# Patient Record
Sex: MALE | Race: BLACK OR AFRICAN AMERICAN | Employment: OTHER | URBAN - NONMETROPOLITAN AREA
[De-identification: names, ages, dates, MRNs, and addresses within clinical notes are randomized per-mention and may not be internally consistent; named-entity substitution may affect disease eponyms.]

---

## 2021-05-25 LAB — COLONOSCOPY, EXTERNAL: NORMAL

## 2022-05-22 ENCOUNTER — HOSPITAL ENCOUNTER (EMERGENCY)
Age: 62
Discharge: HOME | End: 2022-05-22
Attending: EMERGENCY MEDICINE
Payer: COMMERCIAL

## 2022-05-22 ENCOUNTER — APPOINTMENT (OUTPATIENT)
Dept: GENERAL RADIOLOGY | Age: 62
End: 2022-05-22
Attending: EMERGENCY MEDICINE
Payer: COMMERCIAL

## 2022-05-22 VITALS
TEMPERATURE: 98.3 F | RESPIRATION RATE: 20 BRPM | BODY MASS INDEX: 30.48 KG/M2 | DIASTOLIC BLOOD PRESSURE: 76 MMHG | OXYGEN SATURATION: 97 % | HEART RATE: 84 BPM | SYSTOLIC BLOOD PRESSURE: 112 MMHG | HEIGHT: 72 IN | WEIGHT: 225 LBS

## 2022-05-22 DIAGNOSIS — J20.9 ACUTE BRONCHITIS, UNSPECIFIED ORGANISM: Primary | ICD-10-CM

## 2022-05-22 PROCEDURE — 71046 X-RAY EXAM CHEST 2 VIEWS: CPT

## 2022-05-22 PROCEDURE — 74011250637 HC RX REV CODE- 250/637: Performed by: EMERGENCY MEDICINE

## 2022-05-22 PROCEDURE — 99283 EMERGENCY DEPT VISIT LOW MDM: CPT

## 2022-05-22 RX ORDER — PHENYTOIN SODIUM 100 MG/1
300 CAPSULE, EXTENDED RELEASE ORAL
COMMUNITY

## 2022-05-22 RX ORDER — AZITHROMYCIN 250 MG/1
250 TABLET, FILM COATED ORAL DAILY
Qty: 4 TABLET | Refills: 0 | Status: SHIPPED | OUTPATIENT
Start: 2022-05-23 | End: 2022-05-27

## 2022-05-22 RX ORDER — BENZONATATE 100 MG/1
100 CAPSULE ORAL
Qty: 20 CAPSULE | Refills: 0 | Status: SHIPPED | OUTPATIENT
Start: 2022-05-22 | End: 2022-05-29

## 2022-05-22 RX ORDER — RANITIDINE 150 MG/1
150 TABLET, FILM COATED ORAL
COMMUNITY
End: 2022-09-27 | Stop reason: SDUPTHER

## 2022-05-22 RX ORDER — AZITHROMYCIN 250 MG/1
500 TABLET, FILM COATED ORAL
Status: COMPLETED | OUTPATIENT
Start: 2022-05-22 | End: 2022-05-22

## 2022-05-22 RX ORDER — DICLOFENAC SODIUM 50 MG/1
50 TABLET, DELAYED RELEASE ORAL
COMMUNITY
End: 2022-09-27 | Stop reason: SDUPTHER

## 2022-05-22 RX ORDER — BENZONATATE 100 MG/1
100 CAPSULE ORAL
Status: COMPLETED | OUTPATIENT
Start: 2022-05-22 | End: 2022-05-22

## 2022-05-22 RX ORDER — PHENOBARBITAL 64.8 MG/1
TABLET ORAL
COMMUNITY
Start: 2022-04-29 | End: 2022-09-27 | Stop reason: SDUPTHER

## 2022-05-22 RX ADMIN — AZITHROMYCIN 500 MG: 250 TABLET, FILM COATED ORAL at 21:52

## 2022-05-22 RX ADMIN — BENZONATATE 100 MG: 100 CAPSULE ORAL at 21:22

## 2022-05-23 NOTE — ED TRIAGE NOTES
Reports cough for the last couple of days. Cousin insisted on him coming to be checked thought he might have pneumonia. Cough productive of yellow mucus. No fever.

## 2022-05-23 NOTE — ED PROVIDER NOTES
Pt c/o cough, prod of mild yellow sputum. No fever. No sob. No chest pain. No abd pain. No leg pain or swelling. no weakness. Tried cough drop at home but didn't help. Sx's mild to moderate. No sore throat. Past Medical History:   Diagnosis Date    GERD (gastroesophageal reflux disease)     Seizure ([facility] [address]        History reviewed. No pertinent surgical history. History reviewed. No pertinent family history. Social History     Socioeconomic History    Marital status: SINGLE     Spouse name: Not on file    Number of children: Not on file    Years of education: Not on file    Highest education level: Not on file   Occupational History    Not on file   Tobacco Use    Smoking status: Never Smoker    Smokeless tobacco: Never Used   Substance and Sexual Activity    Alcohol use: Not Currently    Drug use: Never    Sexual activity: Not on file   Other Topics Concern    Not on file   Social History Narrative    Not on file     Social Determinants of Health     Financial Resource Strain:     Difficulty of Paying Living Expenses: Not on file   Food Insecurity:     Worried About Running Out of Food in the Last Year: Not on file    Jaz of Food in the Last Year: Not on file   Transportation Needs:     Lack of Transportation (Medical): Not on file    Lack of Transportation (Non-Medical):  Not on file   Physical Activity:     Days of Exercise per Week: Not on file    Minutes of Exercise per Session: Not on file   Stress:     Feeling of Stress : Not on file   Social Connections:     Frequency of Communication with Friends and Family: Not on file    Frequency of Social Gatherings with Friends and Family: Not on file    Attends Nondenominational Services: Not on file    Active Member of Clubs or Organizations: Not on file    Attends Club or Organization Meetings: Not on file    Marital Status: Not on file   Intimate Partner Violence:     Fear of Current or Ex-Partner: Not on file   Roro Morales Emotionally Abused: Not on file    Physically Abused: Not on file    Sexually Abused: Not on file   Housing Stability:     Unable to Pay for Housing in the Last Year: Not on file    Number of Places Lived in the Last Year: Not on file    Unstable Housing in the Last Year: Not on file         ALLERGIES: Patient has no known allergies. Review of Systems   Constitutional: Negative for diaphoresis and fever. HENT: Negative for congestion. Respiratory: Positive for cough. Negative for shortness of breath. Cardiovascular: Negative for chest pain. Gastrointestinal: Negative for abdominal pain and nausea. Musculoskeletal: Negative for back pain. Skin: Negative for rash. Neurological: Negative for dizziness. All other systems reviewed and are negative. Vitals:    05/22/22 2110   BP: 112/76   Pulse: 84   Resp: 20   Temp: 98.3 °F (36.8 °C)   SpO2: 97%   Weight: 102.1 kg (225 lb)   Height: 6' (1.829 m)            Physical Exam  Vitals and nursing note reviewed. Constitutional:       Appearance: He is well-developed. HENT:      Head: Normocephalic and atraumatic. Nose: Nose normal.      Mouth/Throat:      Mouth: Mucous membranes are moist.   Eyes:      Conjunctiva/sclera: Conjunctivae normal.   Cardiovascular:      Rate and Rhythm: Normal rate and regular rhythm. Pulmonary:      Effort: Pulmonary effort is normal. No respiratory distress. Breath sounds: No wheezing or rales. Abdominal:      Palpations: Abdomen is soft. Tenderness: There is no abdominal tenderness. Musculoskeletal:         General: No tenderness. Cervical back: Normal range of motion. Skin:     General: Skin is warm and dry. Capillary Refill: Capillary refill takes less than 2 seconds. Findings: No rash. Neurological:      Mental Status: He is alert and oriented to person, place, and time.    Psychiatric:         Mood and Affect: Mood normal.          MDM Procedures    Vitals:  Patient Vitals for the past 12 hrs:   Temp Pulse Resp BP SpO2   05/22/22 2110 98.3 °F (36.8 °C) 84 20 112/76 97 %         Medications ordered:   Medications   benzonatate (TESSALON) capsule 100 mg (100 mg Oral Given 5/22/22 2122)   azithromycin (ZITHROMAX) tablet 500 mg (500 mg Oral Given 5/22/22 2152)         Lab findings:  No results found for this or any previous visit (from the past 12 hour(s)). X-Ray, CT or other radiology findings or impressions:  XR CHEST PA LAT   Final Result      1. Findings of nonspecific mild bronchitis/bronchiolitis with no focal   pneumonia. Progress notes, Consult notes or additional Procedure notes:   9:40 PM pt feels better, dec cough, no sob, req dc. No sob. Not c/w sepsis/bacteremia/pna/ptx. Stable for dc and close f/u det ret inst given. No emc. Diagnosis:   1. Acute bronchitis, unspecified organism        Disposition: home    Follow-up Information     Follow up With Specialties Details Why Contact [facility] EMERGENCY DEPT Emergency Medicine Go to  As needed, If symptoms worsen [address] MD Family Medicine Schedule an appointment as soon as possible for a visit in 2 days  [address]  [phone]             Discharge Medication List as of 5/22/2022  9:40 PM      START taking these medications    Details   benzonatate (Tessalon Perles) 100 mg capsule Take 1 Capsule by mouth three (3) times daily as needed for Cough for up to 7 days. , Normal, Disp-20 Capsule, R-0      azithromycin (Zithromax Z-Raheem) 250 mg tablet Take 1 Tablet by mouth daily for 4 days. , Normal, Disp-4 Tablet, R-0         CONTINUE these medications which have NOT CHANGED    Details   phenytoin ER (DILANTIN ER) 100 mg ER capsule Take 300 mg by mouth., Historical Med      PHENobarbitaL (LUMINAL) 64.8 mg tablet TAKE 1 TABLET BY MOUTH ONCE DAILY FOR 90 DAYS, Historical Med      raNITIdine (ZANTAC) 150 mg tablet Take 150 mg by mouth., Historical Med      diclofenac EC (VOLTAREN) 50 mg EC tablet Take 50 mg by mouth., Historical Med

## 2022-09-27 ENCOUNTER — OFFICE VISIT (OUTPATIENT)
Dept: FAMILY MEDICINE CLINIC | Age: 62
End: 2022-09-27
Payer: MEDICAID

## 2022-09-27 VITALS
OXYGEN SATURATION: 94 % | HEART RATE: 87 BPM | HEIGHT: 72 IN | SYSTOLIC BLOOD PRESSURE: 120 MMHG | DIASTOLIC BLOOD PRESSURE: 81 MMHG | WEIGHT: 218 LBS | RESPIRATION RATE: 16 BRPM | BODY MASS INDEX: 29.53 KG/M2

## 2022-09-27 DIAGNOSIS — G89.29 CHRONIC BILATERAL THORACIC BACK PAIN: ICD-10-CM

## 2022-09-27 DIAGNOSIS — E78.01 FAMILIAL HYPERCHOLESTEROLEMIA: ICD-10-CM

## 2022-09-27 DIAGNOSIS — M54.6 CHRONIC BILATERAL THORACIC BACK PAIN: ICD-10-CM

## 2022-09-27 DIAGNOSIS — R05.9 COUGH: Primary | ICD-10-CM

## 2022-09-27 DIAGNOSIS — K21.9 CHRONIC GERD: ICD-10-CM

## 2022-09-27 DIAGNOSIS — R56.9 SEIZURE (HCC): ICD-10-CM

## 2022-09-27 PROCEDURE — 99204 OFFICE O/P NEW MOD 45 MIN: CPT | Performed by: FAMILY MEDICINE

## 2022-09-27 RX ORDER — DICLOFENAC SODIUM 50 MG/1
50 TABLET, DELAYED RELEASE ORAL
Qty: 30 TABLET | Refills: 0 | Status: SHIPPED | OUTPATIENT
Start: 2022-09-27 | End: 2022-10-27

## 2022-09-27 RX ORDER — BENZONATATE 100 MG/1
100 CAPSULE ORAL
COMMUNITY
End: 2022-10-05

## 2022-09-27 RX ORDER — RANITIDINE 150 MG/1
150 TABLET, FILM COATED ORAL 2 TIMES DAILY
Qty: 60 TABLET | Refills: 2 | Status: SHIPPED | OUTPATIENT
Start: 2022-09-27 | End: 2022-11-26

## 2022-09-27 RX ORDER — ATORVASTATIN CALCIUM 20 MG/1
20 TABLET, FILM COATED ORAL DAILY
Qty: 90 TABLET | Refills: 2 | Status: SHIPPED | OUTPATIENT
Start: 2022-09-27 | End: 2022-12-26

## 2022-09-27 RX ORDER — OMEPRAZOLE 40 MG/1
40 CAPSULE, DELAYED RELEASE ORAL DAILY
COMMUNITY
End: 2022-09-27

## 2022-09-27 RX ORDER — PHENOBARBITAL 64.8 MG/1
64.8 TABLET ORAL 2 TIMES DAILY
Qty: 60 TABLET | Refills: 0 | Status: SHIPPED | OUTPATIENT
Start: 2022-09-27 | End: 2022-11-26

## 2022-09-27 RX ORDER — ATORVASTATIN CALCIUM 20 MG/1
20 TABLET, FILM COATED ORAL DAILY
COMMUNITY
End: 2022-09-27 | Stop reason: SDUPTHER

## 2022-09-27 NOTE — PROGRESS NOTES
Maria Antonia Gill presents today for a new patient appointment. Here to establish care. Patient is wanting to get medications for bronchitis. Chief Complaint   Patient presents with    New Patient     Establish care       Is someone accompanying this pt? No    Is the patient using any DME equipment during OV? No    Depression Screening:  3 most recent PHQ Screens 9/27/2022   Little interest or pleasure in doing things Not at all   Feeling down, depressed, irritable, or hopeless Not at all   Total Score PHQ 2 0       Learning Assessment:  No flowsheet data found. Abuse Screening:  No flowsheet data found. Fall Risk  No flowsheet data found. ADL  No flowsheet data found. Health Maintenance reviewed and discussed and ordered per Provider. Health Maintenance Due   Topic Date Due    Hepatitis C Screening  Never done    Depression Screen  Never done    DTaP/Tdap/Td series (1 - Tdap) Never done    Lipid Screen  Never done    Colorectal Cancer Screening Combo  Never done    Shingrix Vaccine Age 50> (1 of 2) Never done    COVID-19 Vaccine (3 - Booster for Moderna series) 09/02/2021    Flu Vaccine (1) Never done   . Coordination of Care:  1. Have you been to the ER, urgent care clinic since your last visit? Hospitalized since your last visit? No    2. Have you seen or consulted any other health care providers outside of the [address] [place] [address] since your last visit? Include any pap smears or colon screening.  No

## 2022-09-27 NOTE — PROGRESS NOTES
Thu Toledo is a 57 y.o. male who presents to the office today for the following:  Chief Complaint   Patient presents with    New Patient     Establish care       No Known Allergies    Current Outpatient Medications   Medication Sig    benzonatate (TESSALON) 100 mg capsule Take 100 mg by mouth three (3) times daily as needed for Cough. PHENobarbitaL (LUMINAL) 64.8 mg tablet Take 1 Tablet by mouth two (2) times a day for 60 days. Max Daily Amount: 129.6 mg.    raNITIdine (ZANTAC) 150 mg tablet Take 1 Tablet by mouth two (2) times a day for 60 days. diclofenac EC (VOLTAREN) 50 mg EC tablet Take 1 Tablet by mouth daily as needed for Pain for up to 30 days. atorvastatin (LIPITOR) 20 mg tablet Take 1 Tablet by mouth daily for 90 days. phenytoin ER (DILANTIN ER) 100 mg ER capsule Take 300 mg by mouth. No current facility-administered medications for this visit. Past Medical History:   Diagnosis Date    GERD (gastroesophageal reflux disease)     Seizure ([facility] [address]        No past surgical history on file.     Social History     Socioeconomic History    Marital status: SINGLE     Spouse name: Not on file    Number of children: Not on file    Years of education: Not on file    Highest education level: Not on file   Occupational History    Not on file   Tobacco Use    Smoking status: Never    Smokeless tobacco: Never   Substance and Sexual Activity    Alcohol use: Not Currently    Drug use: Never    Sexual activity: Not on file   Other Topics Concern    Not on file   Social History Narrative    Not on file     Social Determinants of Health     Financial Resource Strain: Not on file   Food Insecurity: Not on file   Transportation Needs: Not on file   Physical Activity: Not on file   Stress: Not on file   Social Connections: Not on file   Intimate Partner Violence: Not on file   Housing Stability: Not on file     or  Social History     Tobacco Use   Smoking Status Never   Smokeless Tobacco Never       No family history on file. HPI:  Patient is here to establish care with new provider. He notes that he does not have a neurologist and has been on seizure medications for many years has not had a recent seizure. Currently on phenobarbital and phenytoin. Was seen in the spring for bronchitis. Notes that he has a chronic cough denies history of asthma or smoking. ROS:  Review of Systems   Constitutional: Negative. Respiratory:  Positive for cough. Physical Exam:  Visit Vitals  /81 (BP 1 Location: Left arm, BP Patient Position: Sitting, BP Cuff Size: Adult)   Pulse 87   Resp 16   Ht 6' (1.829 m)   Wt 218 lb (98.9 kg)   SpO2 94%   BMI 29.57 kg/m²     Physical Exam  Constitutional:       Appearance: Normal appearance. Cardiovascular:      Rate and Rhythm: Normal rate and regular rhythm. Pulmonary:      Effort: Pulmonary effort is normal.      Breath sounds: Normal breath sounds. Neurological:      Mental Status: He is alert. Assessment/Plan:    Orders Placed This Encounter    EMPL [address] [facility]     Referral Priority:   Routine     Referral Type:   Consultation     Referral Reason:   Specialty Services Required     Referred to Provider:   Delfin Curiel MD     Requested Specialty:   Neurology     Number of Visits Requested:   1    benzonatate (TESSALON) 100 mg capsule     Sig: Take 100 mg by mouth three (3) times daily as needed for Cough. DISCONTD: atorvastatin (LIPITOR) 20 mg tablet     Sig: Take 20 mg by mouth daily. DISCONTD: omeprazole (PRILOSEC) 40 mg capsule     Sig: Take 40 mg by mouth daily. PHENobarbitaL (LUMINAL) 64.8 mg tablet     Sig: Take 1 Tablet by mouth two (2) times a day for 60 days. Max Daily Amount: 129.6 mg.     Dispense:  60 Tablet     Refill:  0    raNITIdine (ZANTAC) 150 mg tablet     Sig: Take 1 Tablet by mouth two (2) times a day for 60 days.      Dispense:  60 Tablet     Refill:  2    diclofenac EC (VOLTAREN) 50 mg EC tablet     Sig: Take 1 Tablet by mouth daily as needed for Pain for up to 30 days. Dispense:  30 Tablet     Refill:  0    atorvastatin (LIPITOR) 20 mg tablet     Sig: Take 1 Tablet by mouth daily for 90 days. Dispense:  90 Tablet     Refill:  2     or  1. Cough  Tessalon as needed for cough. 2. Seizure [facility])  Referral to neurology. In the meantime continue phenobarbital and phenytoin. - PHENobarbitaL (LUMINAL) 64.8 mg tablet; Take 1 Tablet by mouth two (2) times a day for 60 days. Max Daily Amount: 129.6 mg.  Dispense: 60 Tablet; Refill: 0  - REFERRAL TO NEUROLOGY    3. Chronic bilateral thoracic back pain  diclofenac as needed. Discussed side effects you sparingly. - diclofenac EC (VOLTAREN) 50 mg EC tablet; Take 1 Tablet by mouth daily as needed for Pain for up to 30 days. Dispense: 30 Tablet; Refill: 0    4. Familial hypercholesterolemia  Stable. Continue atorvastatin. - atorvastatin (LIPITOR) 20 mg tablet; Take 1 Tablet by mouth daily for 90 days. Dispense: 90 Tablet; Refill: 2    5. Chronic GERD  Stable. Continue ranitidine.  - raNITIdine (ZANTAC) 150 mg tablet; Take 1 Tablet by mouth two (2) times a day for 60 days. Dispense: 60 Tablet; Refill: 2        Follow-up and Dispositions    Return in about 1 week (around 10/4/2022) for return in 1 week for blood work in office with  and nurse. Dirk Lara MD

## 2022-10-05 ENCOUNTER — CLINICAL SUPPORT (OUTPATIENT)
Dept: FAMILY MEDICINE CLINIC | Age: 62
End: 2022-10-05

## 2022-10-05 ENCOUNTER — OFFICE VISIT (OUTPATIENT)
Dept: FAMILY MEDICINE CLINIC | Age: 62
End: 2022-10-05
Payer: MEDICAID

## 2022-10-05 VITALS
RESPIRATION RATE: 17 BRPM | WEIGHT: 216.4 LBS | OXYGEN SATURATION: 95 % | TEMPERATURE: 97.6 F | BODY MASS INDEX: 28.68 KG/M2 | HEART RATE: 76 BPM | DIASTOLIC BLOOD PRESSURE: 83 MMHG | SYSTOLIC BLOOD PRESSURE: 110 MMHG | HEIGHT: 73 IN

## 2022-10-05 DIAGNOSIS — Z11.59 NEED FOR HEPATITIS C SCREENING TEST: Primary | ICD-10-CM

## 2022-10-05 DIAGNOSIS — Z13.220 SCREENING FOR HYPERLIPIDEMIA: ICD-10-CM

## 2022-10-05 DIAGNOSIS — Z23 ENCOUNTER FOR IMMUNIZATION: ICD-10-CM

## 2022-10-05 DIAGNOSIS — Z01.89 ROUTINE LAB DRAW: Primary | ICD-10-CM

## 2022-10-05 DIAGNOSIS — R56.9 SEIZURE (HCC): ICD-10-CM

## 2022-10-05 NOTE — PROGRESS NOTES
Dlalas Dawson is a 57 y.o. male who presents to the office today for the following:  Chief Complaint   Patient presents with    Follow-up     1 week follow up        No Known Allergies    Current Outpatient Medications   Medication Sig    PHENobarbitaL (LUMINAL) 64.8 mg tablet Take 1 Tablet by mouth two (2) times a day for 60 days. Max Daily Amount: 129.6 mg.    raNITIdine (ZANTAC) 150 mg tablet Take 1 Tablet by mouth two (2) times a day for 60 days. diclofenac EC (VOLTAREN) 50 mg EC tablet Take 1 Tablet by mouth daily as needed for Pain for up to 30 days. atorvastatin (LIPITOR) 20 mg tablet Take 1 Tablet by mouth daily for 90 days. phenytoin ER (DILANTIN ER) 100 mg ER capsule Take 300 mg by mouth.    benzonatate (TESSALON) 100 mg capsule Take 100 mg by mouth three (3) times daily as needed for Cough. (Patient not taking: Reported on 10/5/2022)     No current facility-administered medications for this visit. Past Medical History:   Diagnosis Date    GERD (gastroesophageal reflux disease)     Seizure ([facility] [address]        History reviewed. No pertinent surgical history.     Social History     Socioeconomic History    Marital status: SINGLE     Spouse name: Not on file    Number of children: Not on file    Years of education: Not on file    Highest education level: Not on file   Occupational History    Not on file   Tobacco Use    Smoking status: Never    Smokeless tobacco: Never   Substance and Sexual Activity    Alcohol use: Not Currently    Drug use: Never    Sexual activity: Not on file   Other Topics Concern    Not on file   Social History Narrative    Not on file     Social Determinants of Health     Financial Resource Strain: Not on file   Food Insecurity: Not on file   Transportation Needs: Not on file   Physical Activity: Not on file   Stress: Not on file   Social Connections: Not on file   Intimate Partner Violence: Not on file   Housing Stability: Not on file     or  Social History     Tobacco Use   Smoking Status Never   Smokeless Tobacco Never       History reviewed. No pertinent family history. HPI:  Patient notes that he has a follow-up with neurology on December 2022. He has been taking his phenobarbital and phenytoin. No seizures. Has GERD and stable on ranitidine. Taking his a atorvastatin daily. Notes that he would like a flu shot today and is already gotten the shingles and completed his colonoscopy in [place] a year ago and was normal.      ROS:  Review of Systems   Constitutional: Negative. Respiratory: Negative. Cardiovascular: Negative. All other systems reviewed and are negative. Physical Exam:  Visit Vitals  /83   Pulse 76   Temp 97.6 °F (36.4 °C)   Resp 17   Ht 6' 0.5\" (1.842 m)   Wt 216 lb 6.4 oz (98.2 kg)   SpO2 95%   BMI 28.95 kg/m²     Physical Exam  Constitutional:       Appearance: Normal appearance. Cardiovascular:      Rate and Rhythm: Normal rate and regular rhythm. Pulmonary:      Effort: Pulmonary effort is normal.      Breath sounds: Normal breath sounds. Neurological:      Mental Status: He is alert. Assessment/Plan:    Orders Placed This Encounter    INFLUENZA VIRUS VACCINE, HIGH DOSE SEASONAL, PRESERVATIVE FREE    CBC WITH AUTOMATED DIFF    METABOLIC PANEL, COMPREHENSIVE    LIPID PANEL    HEPATITIS C AB    TSH 3RD GENERATION    VITAMIN D, 25 HYDROXY     or  1. Need for hepatitis C screening test  Screen for hepatitis C.  - HEPATITIS C AB    2. Screening for hyperlipidemia  screen for hyperlipidemia  - LIPID PANEL    3. Seizure ([address]  Stable on on phenytoin and phenobarbital.  Follow-up with neurology in December 2022. Check basic labs including CBC, CMP, TSH, vitamin D.  - CBC WITH AUTOMATED DIFF  - METABOLIC PANEL, COMPREHENSIVE  - TSH 3RD GENERATION  - VITAMIN D, 25 HYDROXY    4. Encounter for immunization  Given flu shot today.   - INFLUENZA VIRUS VACCINE, HIGH DOSE SEASONAL, PRESERVATIVE FREE    Colonoscopy is up-to-date. Shingles vaccine is up-to-date.           Demetria Kwong MD

## 2022-10-05 NOTE — PROGRESS NOTES
Silvestre Velez presents today for   Chief Complaint   Patient presents with    Follow-up     1 week follow up        Is someone accompanying this pt? No     Is the patient using any DME equipment during OV? No     Depression Screening:  3 most recent PHQ Screens 10/5/2022   Little interest or pleasure in doing things Not at all   Feeling down, depressed, irritable, or hopeless Not at all   Total Score PHQ 2 0       Learning Assessment:  No flowsheet data found. Fall Risk  No flowsheet data found. ADL  No flowsheet data found. Travel Screening:    Travel Screening       Question Response    In the last 10 days, have you been in contact with someone who was confirmed or suspected to have Coronavirus/COVID-19? No / Unsure    Have you had a COVID-19 viral test in the last 10 days? No    Do you have any of the following new or worsening symptoms? None of these    Have you traveled internationally or domestically in the last month? No          Travel History   Travel since 09/05/22    No documented travel since 09/05/22         Health Maintenance reviewed and discussed and ordered per Provider. Health Maintenance Due   Topic Date Due    Hepatitis C Screening  Never done    Lipid Screen  Never done    DTaP/Tdap/Td series (1 - Tdap) Never done    Colorectal Cancer Screening Combo  Never done    Shingrix Vaccine Age 50> (1 of 2) Never done    COVID-19 Vaccine (3 - Booster for Moderna series) 09/02/2021    Flu Vaccine (1) Never done   . Coordination of Care:  1. \"Have you been to the ER, urgent care clinic since your last visit? Hospitalized since your last visit? \" No    2. \"Have you seen or consulted any other health care providers outside of the [address] [place] [address] since your last visit? \" No     3. For patients aged 39-70: Has the patient had a colonoscopy? Yes - Care Gap present. Most recent result on file     If the patient is female:    4.  For patients aged 41-77: Has the patient had a mammogram within the past 2 years? NA - based on age/sex    5. For patients aged 21-65: Has the patient had a pap smear?  NA - based on age/sex

## 2022-10-05 NOTE — PROGRESS NOTES
Verbal order from Deep Segal MD to order labs/sign and draw them in office    Labs were drawn and sent to [facility] by [place]:    The following tubes were sent:    1 Lavendar, 0 Red, 2 SST, 0 Urine    Draw site left antecubital.  Patient tolerated draw with no distress.

## 2022-10-06 LAB
ALBUMIN SERPL-MCNC: 4.6 G/DL (ref 3.8–4.8)
ALBUMIN/GLOB SERPL: 1.6 {RATIO} (ref 1.2–2.2)
ALP SERPL-CCNC: 123 IU/L (ref 44–121)
ALT SERPL-CCNC: 26 IU/L (ref 0–44)
AST SERPL-CCNC: 30 IU/L (ref 0–40)
BASOPHILS # BLD AUTO: 0 X10E3/UL (ref 0–0.2)
BASOPHILS NFR BLD AUTO: 1 %
BILIRUB SERPL-MCNC: 0.3 MG/DL (ref 0–1.2)
BUN SERPL-MCNC: 11 MG/DL (ref 8–27)
BUN/CREAT SERPL: 11 (ref 10–24)
CALCIUM SERPL-MCNC: 9.4 MG/DL (ref 8.6–10.2)
CHLORIDE SERPL-SCNC: 104 MMOL/L (ref 96–106)
CHOLEST SERPL-MCNC: 176 MG/DL (ref 100–199)
CO2 SERPL-SCNC: 23 MMOL/L (ref 20–29)
CREAT SERPL-MCNC: 0.99 MG/DL (ref 0.76–1.27)
EGFR: 85 ML/MIN/1.73
EOSINOPHIL # BLD AUTO: 0.2 X10E3/UL (ref 0–0.4)
EOSINOPHIL NFR BLD AUTO: 4 %
ERYTHROCYTE [DISTWIDTH] IN BLOOD BY AUTOMATED COUNT: 12.5 % (ref 11.6–15.4)
GLOBULIN SER CALC-MCNC: 2.8 G/DL (ref 1.5–4.5)
GLUCOSE SERPL-MCNC: 87 MG/DL (ref 70–99)
HCT VFR BLD AUTO: 46.9 % (ref 37.5–51)
HCV AB S/CO SERPL IA: <0.1 S/CO RATIO (ref 0–0.9)
HDLC SERPL-MCNC: 75 MG/DL
HGB BLD-MCNC: 15.6 G/DL (ref 13–17.7)
IMM GRANULOCYTES # BLD AUTO: 0 X10E3/UL (ref 0–0.1)
IMM GRANULOCYTES NFR BLD AUTO: 0 %
LDLC SERPL CALC-MCNC: 89 MG/DL (ref 0–99)
LYMPHOCYTES # BLD AUTO: 1.7 X10E3/UL (ref 0.7–3.1)
LYMPHOCYTES NFR BLD AUTO: 40 %
MCH RBC QN AUTO: 26.3 PG (ref 26.6–33)
MCHC RBC AUTO-ENTMCNC: 33.3 G/DL (ref 31.5–35.7)
MCV RBC AUTO: 79 FL (ref 79–97)
MONOCYTES # BLD AUTO: 0.3 X10E3/UL (ref 0.1–0.9)
MONOCYTES NFR BLD AUTO: 8 %
NEUTROPHILS # BLD AUTO: 2 X10E3/UL (ref 1.4–7)
NEUTROPHILS NFR BLD AUTO: 47 %
PLATELET # BLD AUTO: 206 X10E3/UL (ref 150–450)
POTASSIUM SERPL-SCNC: 4.1 MMOL/L (ref 3.5–5.2)
PROT SERPL-MCNC: 7.4 G/DL (ref 6–8.5)
RBC # BLD AUTO: 5.93 X10E6/UL (ref 4.14–5.8)
SODIUM SERPL-SCNC: 146 MMOL/L (ref 134–144)
TRIGL SERPL-MCNC: 62 MG/DL (ref 0–149)
TSH SERPL DL<=0.005 MIU/L-ACNC: 1.94 UIU/ML (ref 0.45–4.5)
VLDLC SERPL CALC-MCNC: 12 MG/DL (ref 5–40)
WBC # BLD AUTO: 4.2 X10E3/UL (ref 3.4–10.8)

## 2022-10-07 ENCOUNTER — TELEPHONE (OUTPATIENT)
Dept: FAMILY MEDICINE CLINIC | Age: 62
End: 2022-10-07

## 2022-10-07 NOTE — TELEPHONE ENCOUNTER
Pt called regarding his RX. All meds were received by the pharmacy on 9/27. Pt stated that they can't fill one for 7 days. I told the pt that we have no control over that and he would have to speak with the pharmacy as to why. Pt is going to call the pharmacy back, no further questions or concerns.

## 2022-12-15 ENCOUNTER — TELEPHONE (OUTPATIENT)
Dept: FAMILY MEDICINE CLINIC | Age: 62
End: 2022-12-15

## 2022-12-19 ENCOUNTER — TELEPHONE (OUTPATIENT)
Dept: FAMILY MEDICINE CLINIC | Age: 62
End: 2022-12-19

## 2022-12-19 NOTE — TELEPHONE ENCOUNTER
Pt called and needs appt to get a refill on his meds. We do not accept his insurance, so I explained that he would need to find a provider to establish care with. He understood and stated that he will go to an Urgent Care.

## 2023-08-04 ENCOUNTER — HOSPITAL ENCOUNTER (EMERGENCY)
Age: 63
Discharge: HOME | End: 2023-08-04
Attending: EMERGENCY MEDICINE
Payer: MEDICAID

## 2023-08-04 VITALS
RESPIRATION RATE: 18 BRPM | DIASTOLIC BLOOD PRESSURE: 84 MMHG | WEIGHT: 212 LBS | BODY MASS INDEX: 28.71 KG/M2 | OXYGEN SATURATION: 99 % | SYSTOLIC BLOOD PRESSURE: 140 MMHG | TEMPERATURE: 98.3 F | HEIGHT: 72 IN | HEART RATE: 88 BPM

## 2023-08-04 DIAGNOSIS — J06.9 ACUTE UPPER RESPIRATORY INFECTION: Primary | ICD-10-CM

## 2023-08-04 PROCEDURE — 99282 EMERGENCY DEPT VISIT SF MDM: CPT

## 2023-08-04 RX ORDER — HYDROXYZINE PAMOATE 25 MG/1
25 CAPSULE ORAL
COMMUNITY

## 2023-08-04 RX ORDER — MELOXICAM 15 MG/1
15 TABLET ORAL DAILY
COMMUNITY

## 2023-08-04 RX ORDER — PHENOBARBITAL 64.8 MG/1
64.8 TABLET ORAL DAILY
COMMUNITY

## 2023-08-04 ASSESSMENT — LIFESTYLE VARIABLES
HOW OFTEN DO YOU HAVE A DRINK CONTAINING ALCOHOL: NEVER
HOW MANY STANDARD DRINKS CONTAINING ALCOHOL DO YOU HAVE ON A TYPICAL DAY: PATIENT DOES NOT DRINK

## 2023-08-04 ASSESSMENT — PAIN SCALES - GENERAL: PAINLEVEL_OUTOF10: 0

## 2023-08-04 ASSESSMENT — PAIN - FUNCTIONAL ASSESSMENT: PAIN_FUNCTIONAL_ASSESSMENT: 0-10

## 2023-08-04 NOTE — ED NOTES
Discharge instructions reviewed with patient. Patient verbalized understanding. Patient advised to follow up as directed on discharge instructions. Patient denies questions, needs or concerns at this time. Patient verbalized understanding. No s/sx of distress noted.        Melody Zapata RN  08/04/23 1051

## 2023-08-04 NOTE — ED PROVIDER NOTES
[facility] EMERGENCY DEPT  EMERGENCY DEPARTMENT ENCOUNTER      Pt Name: Mari Keyes  MRN: [id number]  [address] [facility] 1960  Date of evaluation: 8/4/2023  Provider: Mike Richardson MD    [address] [facility]       Chief Complaint   Patient presents with    Cough    Nasal Congestion         HISTORY OF PRESENT ILLNESS   (Location/Symptom, Timing/Onset, Context/Setting, Quality, Duration, Modifying Factors, Severity)  Note limiting factors. Mari Keyes is a 70 y.o. male who presents to the emergency department for evaluation of nasal congestion since he woke this morning. Gradual onset and unchanged in character or severity. No alleviating factors attempted. Believes it to be aggravated by sleeping underneath a ceiling fan last night. No change to the character or severity. Denies any systemic symptoms, recent antibiotics or antipyretics. The history is provided by the patient and medical records. Nursing Notes were reviewed. REVIEW OF SYSTEMS    (2-9 systems for level 4, 10 or more for level 5)     Review of Systems   All other systems reviewed and are negative. Except as noted above the remainder of the review of systems was reviewed and negative. PAST MEDICAL HISTORY     Past Medical History:   Diagnosis Date    GERD (gastroesophageal reflux disease)     Seizure ([address])          SURGICAL HISTORY     History reviewed. No pertinent surgical history. CURRENT MEDICATIONS       Previous Medications    DICLOFENAC (VOLTAREN) 50 MG EC TABLET    Take 1 tablet by mouth    HYDROXYZINE PAMOATE (VISTARIL) 25 MG CAPSULE    Take 1 capsule by mouth    MELOXICAM (MOBIC) 15 MG TABLET    Take 1 tablet by mouth daily    PHENOBARBITAL (LUMINAL) 64.8 MG TABLET    Take 1 tablet by mouth daily. Max Daily Amount: 64.8 mg    PHENYTOIN (DILANTIN) 100 MG ER CAPSULE    Take 300 mg by mouth       ALLERGIES     Patient has no known allergies. FAMILY HISTORY     History reviewed. No pertinent family history.        SOCIAL upper respiratory infection          DISPOSITION/PLAN   DISPOSITION Decision To Discharge 08/04/2023 10:34:46 AM      PATIENT REFERRED TO:  Gayathri Murdock MD  [address]  [phone]    Schedule an appointment as soon as possible for a visit   As needed      DISCHARGE MEDICATIONS:  New Prescriptions    No medications on file     Controlled Substances Monitoring:     No flowsheet data found.     (Please note that portions of this note were completed with a voice recognition program.  Efforts were made to edit the dictations but occasionally words are mis-transcribed.)    Joyce Lane MD (electronically signed)  Attending Emergency Physician            Roxie Mcknight MD  08/04/23 103

## 2023-12-05 ENCOUNTER — HOSPITAL ENCOUNTER (EMERGENCY)
Age: 63
Discharge: HOME | End: 2023-12-05
Attending: EMERGENCY MEDICINE
Payer: MEDICAID

## 2023-12-05 VITALS
SYSTOLIC BLOOD PRESSURE: 129 MMHG | OXYGEN SATURATION: 100 % | DIASTOLIC BLOOD PRESSURE: 88 MMHG | HEIGHT: 72 IN | TEMPERATURE: 98.2 F | WEIGHT: 220 LBS | HEART RATE: 82 BPM | BODY MASS INDEX: 29.8 KG/M2 | RESPIRATION RATE: 18 BRPM

## 2023-12-05 DIAGNOSIS — J04.0 LARYNGITIS: ICD-10-CM

## 2023-12-05 DIAGNOSIS — J06.9 ACUTE UPPER RESPIRATORY INFECTION: Primary | ICD-10-CM

## 2023-12-05 PROCEDURE — 99282 EMERGENCY DEPT VISIT SF MDM: CPT

## 2023-12-05 ASSESSMENT — PAIN - FUNCTIONAL ASSESSMENT: PAIN_FUNCTIONAL_ASSESSMENT: NONE - DENIES PAIN

## 2023-12-05 ASSESSMENT — LIFESTYLE VARIABLES
HOW MANY STANDARD DRINKS CONTAINING ALCOHOL DO YOU HAVE ON A TYPICAL DAY: PATIENT DOES NOT DRINK
HOW OFTEN DO YOU HAVE A DRINK CONTAINING ALCOHOL: NEVER

## 2023-12-05 NOTE — ED PROVIDER NOTES
[facility] EMERGENCY DEPT  EMERGENCY DEPARTMENT ENCOUNTER      Pt Name: Vielka Thornton  MRN: [id number]  [address] [facility] 1960  Date of evaluation: 12/5/2023  Provider: Shellee Habermann, MD    CHIEF COMPLAINT       Chief Complaint   Patient presents with    Hoarse         HISTORY OF PRESENT ILLNESS   (Location/Symptom, Timing/Onset, Context/Setting, Quality, Duration, Modifying Factors, Severity)  Note limiting factors. Vielka Thornton is a 70 y.o. male who presents to the emergency department for evaluation of runny nose and hoarse voice. Gradual onset and intermittent for the last 3 days. Patient feels most congested and has a hoarse throat in the morning that improves throughout the day. Denies any sick contacts, systemic symptoms, recent antibiotics or antipyretics. No alleviating factors attempted. No clear aggravating factors other than the weather change. No change to the character or severity. Otherwise has no subjective physical complaints. The history is provided by the patient and medical records. Nursing Notes were reviewed. REVIEW OF SYSTEMS    (2-9 systems for level 4, 10 or more for level 5)     Review of Systems   All other systems reviewed and are negative. Except as noted above the remainder of the review of systems was reviewed and negative. PAST MEDICAL HISTORY     Past Medical History:   Diagnosis Date    GERD (gastroesophageal reflux disease)     Seizure ([address])          SURGICAL HISTORY     History reviewed. No pertinent surgical history.       CURRENT MEDICATIONS       Discharge Medication List as of 12/5/2023  8:25 AM        CONTINUE these medications which have NOT CHANGED    Details   meloxicam (MOBIC) 15 MG tablet Take 1 tablet by mouth dailyHistorical Med      diclofenac (VOLTAREN) 50 MG EC tablet Take 1 tablet by mouthHistorical Med      hydrOXYzine pamoate (VISTARIL) 25 MG capsule Take 1 capsule by mouthHistorical Med      PHENobarbital (LUMINAL) 64.8 MG tablet Take

## 2023-12-05 NOTE — DISCHARGE INSTRUCTIONS
You have a mild case of laryngitis because of the postnasal drip. I have written some instructions that may help reduce the nasal congestion and postnasal drip. The laryngitis will recover in the next few days. Try the following to help reduce nasal congestion:    Saline irrigation every 2 hours as needed, then blow your nose. Nasal steroid such as Nasacort or Flonase to help reduce congestion. This will take approximately 2 to 3 days to be maximally effective. If you have significant nasal congestion you may try oxymetazoline or phenylephrine nasal spray for up to 2 days. Any longer use may result in worsened congestion. Prop your head up at nighttime to aid drainage and help reduce sore throat.